# Patient Record
Sex: FEMALE | Race: WHITE
[De-identification: names, ages, dates, MRNs, and addresses within clinical notes are randomized per-mention and may not be internally consistent; named-entity substitution may affect disease eponyms.]

---

## 2017-01-26 ENCOUNTER — HOSPITAL ENCOUNTER (EMERGENCY)
Dept: HOSPITAL 25 - UCCORT | Age: 37
Discharge: HOME | End: 2017-01-26
Payer: COMMERCIAL

## 2017-01-26 VITALS — DIASTOLIC BLOOD PRESSURE: 89 MMHG | SYSTOLIC BLOOD PRESSURE: 129 MMHG

## 2017-01-26 DIAGNOSIS — Z88.2: ICD-10-CM

## 2017-01-26 DIAGNOSIS — Z88.1: ICD-10-CM

## 2017-01-26 DIAGNOSIS — Z88.8: ICD-10-CM

## 2017-01-26 DIAGNOSIS — J40: Primary | ICD-10-CM

## 2017-01-26 PROCEDURE — G0463 HOSPITAL OUTPT CLINIC VISIT: HCPCS

## 2017-01-26 PROCEDURE — 99212 OFFICE O/P EST SF 10 MIN: CPT

## 2017-01-26 NOTE — UC
Respiratory Complaint HPI





- HPI Summary


HPI Summary: 





COUGH X 5 DAYS , + CHEST CONGESTION, WHEEZING 


NO FEVER, + CHILLS 





- History of Current Complaint


Chief Complaint: UCGeneralIllness


Stated Complaint: WHEEZING,FEVER


Time Seen by Provider: 01/26/17 08:14


Hx Obtained From: Patient


Hx Last Menstrual Period: 1/12/17


Onset/Duration: Gradual Onset, Lasting Days - 5, Still Present


Timing: Constant


Severity Initially: Moderate


Severity Currently: Moderate


Character: Cough: Nonproductive


Aggravating Factors: Exertion, Deep Breaths


Associated Signs And Symptoms: Positive: Wheezing, URI, Nasal Congestion





- Allergies/Home Medications


Allergies/Adverse Reactions: 


 Allergies











Allergy/AdvReac Type Severity Reaction Status Date / Time


 


Prednisone Allergy  Swelling Verified 04/30/14 17:09





   Of  





   Face,Lips,&  





   Throat  


 


Sulfa Drugs Allergy  Itching Verified 04/30/14 17:09


 


Tetracyclines Allergy  Itching Verified 04/30/14 17:09


 


acid reflux med Allergy  Tachycardia Uncoded 04/30/14 17:09














PMH/Surg Hx/FS Hx/Imm Hx


Respiratory History Of: Reports: Asthma





- Surgical History


Surgical History: Yes


Surgery Procedure, Year, and Place: tubal; right hand x 2, including ulnar 

nerve entrapment 2000.  tonsilectomy'87.  eye '94





- Family History


Known Family History: 


   Negative: Diabetes





- Social History


Alcohol Use: None


Substance Use Type: None


Smoking Status (MU): Never Smoked Tobacco





Review of Systems


Constitutional: Fatigue


Skin: Negative


Eyes: Negative


ENT: Nasal Discharge


Respiratory: Cough


Cardiovascular: Negative


All Other Systems Reviewed And Are Negative: Yes





Physical Exam


Triage Information Reviewed: Yes


Appearance: Well-Appearing, No Pain Distress, Well-Nourished


Vital Signs: 


 Initial Vital Signs











Temp  98.6 F   01/26/17 08:15


 


Pulse  112   01/26/17 08:15


 


Resp  16   01/26/17 08:15


 


BP  129/89   01/26/17 08:15


 


Pulse Ox  97   01/26/17 08:15











Vital Signs Reviewed: Yes


Eyes: Positive: Conjunctiva Clear


ENT: Positive: Normal ENT inspection, Hearing grossly normal, Pharynx normal, 

Nasal congestion, Nasal drainage, TMs normal


Neck: Positive: Supple, Nontender, No Lymphadenopathy


Respiratory: Positive: Chest non-tender, Wheezing


Cardiovascular: Positive: No Murmur, Tachycardia


Skin Exam: Normal





UC Diagnostic Evaluation





- Laboratory


O2 Sat by Pulse Oximetry: 97





Respiratory Course/Dx





- Differential Dx/Diagnosis


Provider Diagnoses: BRONCHITIS





Discharge





- Discharge Plan


Condition: Stable


Disposition: HOME


Prescriptions: 


Azithromycin TAB* [Zithromax TAB (Z-BARRY) 250 mg #6 tabs] 2 tab PO .TODAY, THEN 

1 DAILY #1 barry


Levalbuterol HFA INHALER* [Xopenex Hfa Inhaler*] 1 puff INH Q4H PRN #1 mdi


 PRN Reason: Shortness Of Breath


Patient Education Materials:  Acute Bronchitis (ED)


Referrals: 


No Primary Care Phys,NOPCP [Primary Care Provider] - 7 Days

## 2017-03-01 ENCOUNTER — HOSPITAL ENCOUNTER (OUTPATIENT)
Dept: HOSPITAL 25 - ED | Age: 37
Setting detail: OBSERVATION
LOS: 1 days | Discharge: HOME | End: 2017-03-02
Attending: SURGERY | Admitting: SURGERY
Payer: MEDICAID

## 2017-03-01 ENCOUNTER — HOSPITAL ENCOUNTER (EMERGENCY)
Dept: HOSPITAL 25 - UCCORT | Age: 37
Discharge: LEFT BEFORE DISCHARGE | End: 2017-03-01
Payer: COMMERCIAL

## 2017-03-01 VITALS — DIASTOLIC BLOOD PRESSURE: 81 MMHG | SYSTOLIC BLOOD PRESSURE: 115 MMHG

## 2017-03-01 DIAGNOSIS — Z88.1: ICD-10-CM

## 2017-03-01 DIAGNOSIS — Z88.8: ICD-10-CM

## 2017-03-01 DIAGNOSIS — Z88.2: ICD-10-CM

## 2017-03-01 DIAGNOSIS — K21.9: ICD-10-CM

## 2017-03-01 DIAGNOSIS — R10.11: Primary | ICD-10-CM

## 2017-03-01 DIAGNOSIS — K81.0: Primary | ICD-10-CM

## 2017-03-01 DIAGNOSIS — J45.909: ICD-10-CM

## 2017-03-01 DIAGNOSIS — Z23: ICD-10-CM

## 2017-03-01 DIAGNOSIS — R11.0: ICD-10-CM

## 2017-03-01 LAB
ALBUMIN SERPL BCG-MCNC: 3.9 G/DL (ref 3.2–5.2)
ALP SERPL-CCNC: 77 U/L (ref 34–104)
ALT SERPL W P-5'-P-CCNC: 48 U/L (ref 7–52)
AMYLASE SERPL-CCNC: 55 U/L (ref 29–103)
ANION GAP SERPL CALC-SCNC: 6 MMOL/L (ref 2–11)
AST SERPL-CCNC: 28 U/L (ref 13–39)
BUN SERPL-MCNC: 12 MG/DL (ref 6–24)
BUN/CREAT SERPL: 16.2 (ref 8–20)
CALCIUM SERPL-MCNC: 8.6 MG/DL (ref 8.6–10.3)
CHLORIDE SERPL-SCNC: 104 MMOL/L (ref 101–111)
GLOBULIN SER CALC-MCNC: 2.7 G/DL (ref 2–4)
GLUCOSE SERPL-MCNC: 94 MG/DL (ref 70–100)
HCO3 SERPL-SCNC: 26 MMOL/L (ref 22–32)
HCT VFR BLD AUTO: 36 % (ref 35–47)
HGB BLD-MCNC: 11.9 G/DL (ref 12–16)
LIPASE SERPL-CCNC: 22 U/L (ref 11–82)
MCH RBC QN AUTO: 26 PG (ref 27–31)
MCHC RBC AUTO-ENTMCNC: 33 G/DL (ref 31–36)
MCV RBC AUTO: 80 FL (ref 80–97)
POTASSIUM SERPL-SCNC: 3.8 MMOL/L (ref 3.5–5)
PROT SERPL-MCNC: 6.6 G/DL (ref 6.4–8.9)
RBC # BLD AUTO: 4.5 10^6/UL (ref 4–5.4)
SODIUM SERPL-SCNC: 136 MMOL/L (ref 133–145)
WBC # BLD AUTO: 5.8 10^3/UL (ref 3.5–10.8)

## 2017-03-01 PROCEDURE — 87086 URINE CULTURE/COLONY COUNT: CPT

## 2017-03-01 PROCEDURE — 83690 ASSAY OF LIPASE: CPT

## 2017-03-01 PROCEDURE — 85025 COMPLETE CBC W/AUTO DIFF WBC: CPT

## 2017-03-01 PROCEDURE — 81015 MICROSCOPIC EXAM OF URINE: CPT

## 2017-03-01 PROCEDURE — 76705 ECHO EXAM OF ABDOMEN: CPT

## 2017-03-01 PROCEDURE — G0378 HOSPITAL OBSERVATION PER HR: HCPCS

## 2017-03-01 PROCEDURE — 86140 C-REACTIVE PROTEIN: CPT

## 2017-03-01 PROCEDURE — G0463 HOSPITAL OUTPT CLINIC VISIT: HCPCS

## 2017-03-01 PROCEDURE — 96372 THER/PROPH/DIAG INJ SC/IM: CPT

## 2017-03-01 PROCEDURE — 94760 N-INVAS EAR/PLS OXIMETRY 1: CPT

## 2017-03-01 PROCEDURE — 99212 OFFICE O/P EST SF 10 MIN: CPT

## 2017-03-01 PROCEDURE — 82150 ASSAY OF AMYLASE: CPT

## 2017-03-01 PROCEDURE — 80053 COMPREHEN METABOLIC PANEL: CPT

## 2017-03-01 PROCEDURE — 90471 IMMUNIZATION ADMIN: CPT

## 2017-03-01 PROCEDURE — 0FT44ZZ RESECTION OF GALLBLADDER, PERCUTANEOUS ENDOSCOPIC APPROACH: ICD-10-PCS | Performed by: SURGERY

## 2017-03-01 PROCEDURE — 90686 IIV4 VACC NO PRSV 0.5 ML IM: CPT

## 2017-03-01 PROCEDURE — 99284 EMERGENCY DEPT VISIT MOD MDM: CPT

## 2017-03-01 PROCEDURE — 81003 URINALYSIS AUTO W/O SCOPE: CPT

## 2017-03-01 PROCEDURE — 36415 COLL VENOUS BLD VENIPUNCTURE: CPT

## 2017-03-01 PROCEDURE — 88304 TISSUE EXAM BY PATHOLOGIST: CPT

## 2017-03-01 PROCEDURE — G0008 ADMIN INFLUENZA VIRUS VAC: HCPCS

## 2017-03-01 NOTE — RAD
Indication: Right upper quadrant pain.



Real-time sonography of the right upper quadrant was performed.



The liver measures 18 cm in length. It is diffusely increased in echogenicity consistent

with hepatic steatosis. No focal lesions or intrahepatic ductal dilatation is noted.



The gallbladder demonstrates moderate distention. There are nonshadowing echogenic foci in

the dependent portion of the gallbladder which appear nonshadowing calculi or cholesterol

polyps. There is a small amount of pericholecystic fluid present. There is a sonographic

Rasheed's sign.



Right kidney measures 10.7 x 3.6 x 3.9 cm with no hydronephrosis.



Pancreas is not visualized due to overlying gas.



Aorta and inferior vena cava are unremarkable.



IMPRESSION: Distended gallbladder with pericholecystic fluid and a positive sonographic

Rasheed's sign. Echogenic foci in the gallbladder which are nonshadowing in the dependent

portion of the gallbladder for which this may represent polyps or nonshadowing gallstones.

## 2017-03-01 NOTE — UC
Abdominal Pain Female HPI





- HPI Summary


HPI Summary: 





35 yo female with the acute onset of 8/10 ruq abd pain about 2AM


pain radiates across upper abd and goes to back


nausea but no vomiting


no f/c


last week had gi bug and lost wt





only abd surg has been bilateral TL





- History of Current Complaint


Chief Complaint: UCGI


Stated Complaint: RIB/BACK PAIN


Time Seen by Provider: 03/01/17 07:34


Hx Obtained From: Patient


Hx Last Menstrual Period: 2/15/17


Onset/Duration: Sudden Onset, Lasting Hours


Timing: Constant


Severity Initially: Severe


Severity Currently: Mild


Pain Intensity: 3


Pain Scale Used: 0-10 Numeric


Location: Discrete At: RUQ


Radiates: Yes


Radiates to: Back


Character: Colicy


Aggravating Factor(s): Nothing


Alleviating Factor(s): Nothing


Associated Signs and Symptoms: Positive: Nausea


Allergies/Adverse Reactions: 


 Allergies











Allergy/AdvReac Type Severity Reaction Status Date / Time


 


Prednisone Allergy  Swelling Verified 03/01/17 07:22





   Of  





   Face,Lips,&  





   Throat  


 


Sulfa Drugs Allergy  Itching Verified 03/01/17 07:22


 


Tetracyclines Allergy  Itching Verified 03/01/17 07:22


 


acid reflux med Allergy  Tachycardia Uncoded 03/01/17 07:22











Home Medications: 


 Home Medications





Famotidine TAB* [Pepcid 20 MG TAB*] 20 mg PO DAILY 03/01/17 [History Confirmed 

03/01/17]


Probiotic Product [Probiotic Daily] 1 cap PO DAILY 03/01/17 [History Confirmed 

03/01/17]











PMH/Surg Hx/FS Hx/Imm Hx


Previously Healthy: Yes


Respiratory History Of: Reports: Asthma





- Surgical History


Surgical History: Yes


Surgery Procedure, Year, and Place: tubal; right hand x 2, including ulnar 

nerve entrapment 2000.  tonsilectomy'87.  eye '94





- Family History


Known Family History: Positive: Hypertension


   Negative: Diabetes





- Social History


Alcohol Use: None


Substance Use Type: None


Smoking Status (MU): Never Smoked Tobacco





Review of Systems


Constitutional: Negative


Skin: Negative


Eyes: Negative


ENT: Negative


Respiratory: Negative


Cardiovascular: Negative


Gastrointestinal: Abdominal Pain


Genitourinary: Negative


Motor: Negative


Neurovascular: Negative


Musculoskeletal: Negative


Neurological: Negative


Psychological: Negative


All Other Systems Reviewed And Are Negative: Yes





Physical Exam


Triage Information Reviewed: Yes


Appearance: Well-Nourished, Pain Distress


Vital Signs: 


 Initial Vital Signs











Temp  99.2 F   03/01/17 07:25


 


Pulse  117   03/01/17 07:25


 


Resp  20   03/01/17 07:25


 


BP  133/73   03/01/17 07:25


 


Pulse Ox  99   03/01/17 07:25











Vital Signs Reviewed: Yes


Eyes: Positive: Conjunctiva Clear


ENT: Positive: Hearing grossly normal.  Negative: Nasal congestion, Nasal 

drainage


Neck: Positive: Supple, Nontender


Respiratory: Positive: Lungs clear, Normal breath sounds, No respiratory 

distress


Cardiovascular: Positive: No Murmur, Pulses Normal, Brisk Capillary Refill


Abdomen Description: Negative: Nontender - ndernessuq te


Bowel Sounds: Positive: Present


Musculoskeletal: Positive: ROM Intact, No Edema


Neurological: Positive: Alert


Psychological Exam: Normal


Skin Exam: Normal





Abd Pain Female Course/Dx





- Course


Course Of Treatment: TO [facility] ED.  D/W attending.  declines EMS.   will 

drive her





- Differential Dx/Diagnosis


Provider Diagnoses: RUQ abd pain.  suspect gall bladder disease





Discharge





- Discharge Plan


Condition: Stable


Disposition: AGAINST MEDICAL ADVICE


Referrals: 


No Primary Care Phys,NOPCP [Primary Care Provider] -

## 2017-03-01 NOTE — ED
Juan Francisco SIDDIQUI Adam, scribed for Kurt Garcia MD on 03/01/17 at 1037 .





Abdominal Pain/Female





- HPI Summary


HPI Summary: 


Pt is a 36 year old female presenting with RUQ abdominal pain. The pain set on 

suddenly at 03:20 this morning, waking the pt from sleep. She states that she 

had already woken up several times to urinate during the night. The RUQ pain 

radiates around to the pt's back. She went to [facility] and 

states that she was sent to the ED for ultrasound. Her pain was 8/10 in 

severity but is currently 4-5 after she was given Toradol at the clinic. Pt 

also c/o nausea, chills, and soft stool which is not watery. She denies 

vomiting. She states that she and the rest of her family had a stomach bug last 

week but that she has not experienced this pain before. PMHx of asthma and 

macular degeneration. Surgical Hx of tubal ligation and tonsillectomy. No 

tobacco/alcohol use.








- History of Current Complaint


Chief Complaint: EDAbdPain


Stated Complaint: ABD/BACK PAIN/NAUSEA


Time Seen by Provider: 03/01/17 10:26


Hx Obtained From: Patient


Hx Last Menstrual Period: 2/15/17


Onset/Duration: Sudden Onset, Lasting Hours, Still Present


Timing: Constant


Severity Initially: Moderate


Severity Currently: Moderate


Pain Intensity: 6


Pain Scale Used: 0-10 Numeric


Location: Discrete At: RUQ


Radiates: Yes


Radiates to: Back


Aggravating Factor(s): Nothing


Alleviating Factor(s): Medications - Toradol


Associated Signs and Symptoms: Positive: Back Pain, Nausea, Diarrhea - Soft 

stool, not watery, Other: - Chills.  Negative: Vomiting


Allergies/Adverse Reactions: 


 Allergies











Allergy/AdvReac Type Severity Reaction Status Date / Time


 


Prednisone Allergy  Swelling Verified 03/01/17 10:06





   Of  





   Face,Lips,&  





   Throat  


 


Sulfa Drugs Allergy  Itching Verified 03/01/17 10:06


 


Tetracyclines Allergy  Itching Verified 03/01/17 10:06


 


acid reflux med Allergy  Tachycardia Uncoded 03/01/17 10:06














PMH/Surg Hx/FS Hx/Imm Hx


Respiratory History: Reports: Hx Asthma





- Surgical History


Surgery Procedure, Year, and Place: tubal; right hand x 2, including ulnar 

nerve entrapment 2000.  tonsilectomy'87.  eye '94


Infectious Disease History: No


Infectious Disease History: 


   Denies: Traveled Outside the [place] in Last 30 Days





- Family History


Known Family History: Positive: Hypertension, Other - "Everything"


   Negative: Diabetes





- Social History


Occupation: Employed Full-time - "Homemaker"


Lives: With Family - 


Alcohol Use: None


Hx Substance Use: No


Substance Use Type: Reports: None


Hx Tobacco Use: No


Smoking Status (MU): Never Smoked Tobacco





Review of Systems


Positive: Chills


Positive: Abdominal Pain, Diarrhea - Soft stool, not watery, Nausea.  Negative: 

Vomiting


All Other Systems Reviewed And Are Negative: Yes





Physical Exam





- Summary


Physical Exam Summary: 





VITAL SIGNS: Reviewed. 


GENERAL: Patient is a well developed and nourished female who is lying 

comfortable in the stretcher. Patient is not in any acute respiratory distress. 


HEAD AND FACE: Normocephalic and atraumatic. 


EYES: PERRLA, EOMI x 2, No injected conjunctiva.


EARS: Hearing grossly intact. Ear canals and tympanic membranes are WNL.


MOUTH: Oropharynx within normal limits. 


NECK: Supple, trachea is midline, no adenopathy, no JVD.


CHEST: Symmetric, no tenderness at palpation 


LUNGS: Clear to auscultation bilaterally. No wheezing or crackles.


CVS: RRR,, S1 and S2 present, no murmurs or gallops appreciated. 


ABDOMEN: Soft, positive RUQ tenderness. No signs of distention. Positive bowel 

sounds. No rebound no guarding, and no masses palpated. No abdominal bruit or 

pulsations. 


EXTREMITIES: FROM in all major joints, no edema, no cyanosis or clubbing.


NEURO: Alert and oriented x 3. No acute neurological deficits. Speech is normal.


SKIN: Dry and warm





Triage Information Reviewed: Yes


Vital Signs On Initial Exam: 


 Initial Vitals











Temp Pulse Resp BP Pulse Ox


 


 99.1 F   103   16   125/82   98 


 


 03/01/17 10:06  03/01/17 10:06  03/01/17 10:06  03/01/17 10:06  03/01/17 10:06











Vital Signs Reviewed: Yes





Diagnostics





- Vital Signs


 Vital Signs











  Temp Pulse Resp BP Pulse Ox


 


 03/01/17 10:06  99.1 F  103  16  125/82  98














- Laboratory


Result Diagrams: 


 03/01/17 10:40





 03/01/17 10:40


Lab Statement: Any lab studies that have been ordered have been reviewed, and 

results considered in the medical decision making process.





- Additional Comments


Diagnostic Additional Comments: 


Gallbladder ultrasound - IMPRESSION: Distended gallbladder with pericholecystic 

fluid and a positive sonographic


Rasheed's sign. Echogenic foci in the gallbladder which are nonshadowing in the 

dependent


portion of the gallbladder for which this may represent polyps or nonshadowing 

gallstones.








Abdominal Pain Fem Course/Dx





- Course


Course Of Treatment: Surgery was called at approximately 12:00. The doctor was 

in surgery but was to be notified of the request for consult.  13:10 - The PA 

for Dr. Choi came to examine the patient and decided to discharge her home, but 

the patient is now in pain.  13:15 - Talked to Dr. Choi on the phone. He will 

send the PA back down to re-examine the patient.  13:26 - Dr. Choi's PA came to 

re-examine the patient and they have decided to accept her for admission.  Pt 

is a 36 year old female presenting with RUQ abdominal pain. The pain set on 

suddenly at 03:20 this morning, waking the pt from sleep. She states that she 

had already woken up several times to urinate during the night. The RUQ pain 

radiates around to the pt's back. She went to [facility] and 

states that she was sent to the ED for ultrasound. Her pain was 8/10 in 

severity but is currently 4-5 after she was given Toradol at the clinic. Pt 

also c/o nausea, chills, and soft stool which is not watery. She denies 

vomiting. She states that she and the rest of her family had a stomach bug last 

week but that she has not experienced this pain before. PMHx of asthma and 

macular degeneration. Surgical Hx of tubal ligation and tonsillectomy. No 

tobacco/alcohol use.  BW is WNL except for hemoglobin of 11.9 and C-Reactive 

Protein of 44.53. US of RUQ shows a distended gallbladder with pericholecystic 

fluid and positive Rasheed's sign. I discussed the case with Dr. Choi who sent a 

PA to evaluate the patient. Inititially they recommended that the pt be 

discharged seeing as she was asymptomatic. Before discharge the pt developed 

more pain with mild nausea without vomiting. The pt was reassessed by Dr. Choi'

s PA and now they recommend for the pt to be admitted for further work-up and 

management. The pt was given IV fluids, somethng for nausea, and morphine for 

pain. The pt is hemodynamically stable and A&Ox3. She will be admitted to Dr. Choi's services.





- Diagnoses


Differential Diagnosis: Positive: Constipation, Gall Bladder Disease, 

Pancreatitis, Renal Colic, Urinary Tract Infection


Provider Diagnoses: 


 Acute cholecystitis








Discharge





- Discharge Plan


Condition: Stable


Disposition: ADMITTED TO [facility]


Referrals: 


No Primary Care Phys,NOPCP [Primary Care Provider] - 





The documentation as recorded by the Juan Francisco joe Adam accurately reflects 

the service I personally performed and the decisions made by me, Kurt Garcia MD.

## 2017-03-01 NOTE — PN
Progress Note





- Progress Note


Note: 





Brief Operative Note:





Preop Dx: Acute cholecystitis


Postop Dx: same


Procedure: laparoscopic cholecystectomy


Anesthesia: GET


Surgeon: Zachary


Asst: PAMELA Thomas


EBL: <100 ml


Fluids: 1000 ml RL


Drains: none


Specimen: GB


Findings: dictated

## 2017-03-01 NOTE — CONS
Amended report to correct account number.



CONSULTATION REPORT:

 

DATE OF CONSULT:  03/01/17

 

ATTENDING PHYSICIAN:  Aric Sharma MD.

 

REASON FOR CONSULTATION:  Right upper quadrant abdominal pain and acute 
cholecystitis.

 

CHIEF COMPLAINT:  Abdominal pain.

 

HISTORY OF PRESENT ILLNESS:  Ms. Callaway is a pleasant 36-year-old female who 
presented to the emergency room earlier today with complaints of progressively 
worsening epigastric and right upper quadrant pain since early this morning.  
The patient notes that her pain started roughly around 3 o'clock this morning 
that awakened her up from her sleep.  She notes pain has been dull and sharp on 
occasion, located in the epigastric and right upper quadrant with radiation to 
her right flank and back.  She notes associated nausea, but denies any 
vomiting.  She already has awoken several times during the night, but pain has 
gotten progressively worse that kept her awake since 3 o'clock this morning.  
She denies any other associated symptoms.  She has never had any similar 
complaints in the past.  She denies any jaundice or changes in the color of 
stools or urine.  She went to [facility] where she resides close by 
and she was evaluated there and was told to come to the emergency room for 
further evaluation regarding possible gall-bladder disease.  Her pain back then 
was 8/10 in severity and was relieved after she was given a shot of Toradol.  
During her ER visit, the patient noted that pain eventually got better and 
denies any further nausea or  vomiting. She notes that herself and her entire 
family had had "stomach bug" last week, but everybody has been doing well since 
last Saturday with the exception of occasional diarrhea last week and she 
denies any changes in the bowel habits.  She otherwise is a relatively healthy 
young female who appeared comfortable at this time of consultation.

 

PAST MEDICAL HISTORY:  Significant for macular degeneration for which she had a 
surgery on her left eye at age 14.  She tells me that she is legally blind.  
She also has a history of asthma with occasional use of inhalers.

 

PAST SURGICAL HISTORY:  Significant for tubal ligation and tonsillectomy.  She 
denies any prior abdominal surgery.

 

CURRENT MEDICATIONS:  At home include:

1.  Pepcid 20 mg p.o. daily.

2.  Albuterol inhaler 2 puffs as needed for shortness of breath.

3.  Probiotic product such as yogurt.

 

ALLERGIES:  She reports allergies to PREDNISONE causing swelling of her face 
and throat, SULFA DRUGS and TETRACYCLINES that caused itching as well as some 
acid reflux medicine that she could not remember the name that caused 
tachycardia.

 

FAMILY HISTORY:  Significant for hypertension and cardiovascular disease on 
both her maternal and paternal sides, but denies any colorectal malignancies.  
She also notes history of gallbladder disease in her maternal grandmother and 
few aunts and uncles.

 

SOCIAL HISTORY:  The patient is a nonsmoker who drinks rarely and denies any 
recreational drug use.

 

REVIEW OF SYSTEMS:  See HPI.  Otherwise negative.  She denies any fever, chills
, or recent weight loss.  No sore throat, difficulty breathing, or dysphagia.  
No dyspnea, shortness of breath, or chest pain.  Denies any syncope, blurred 
vision, or diplopia.  She denies any hematuria, dysuria, or urinary frequency.

 

PHYSICAL EXAM:  General:  She is a pleasant, obese, middle-aged female in no 
acute distress or discomfort at the time of consultation.  Vitals:  Her vitals 
revealed a temperature of 99.1.  Her blood pressure is 125/77, O2 sats of 98%, 
pulse of 102. HEENT:  Sclerae anicteric.  PERRLA.  EOMs intact.  Oropharynx is 
pink, moist with no exudate.  Neck:  Supple.  Trachea midline.  No cervical 
adenopathy, thyromegaly, or JVD.  Lungs:  Clear to auscultation bilaterally.  
Heart:  Regular rate and rhythm.  Normal S1 and S2 without rubs, murmurs, or 
gallops.  Back:  With normal curvature.  No CVA tenderness.  Breast Exam:  
Deferred at this time.  Abdomen: Soft and nondistended.  There is moderate 
epigastric and right upper quadrant tenderness on palpation. There is no 
guarding, rigidity, or rebound tenderness. No hernias, masses, or 
hepatosplenomegaly.  There is very mild positivity for Rasheed's sign on deep 
palpation.  Extremities: Without cyanosis, clubbing, or edema.  Neurologic:  
Grossly intact.  Rectal:  Exam deferred at this time.

 

LABORATORY WORKUP:  The patient had CBC and chemistry workup at ED visit 
revealing normal white count of 5800, hemoglobin 11.9, hematocrit 36, and 
platelets of 213. Her sodium was 136, potassium 3.8, chloride 104, carbon 
dioxide 26, BUN is 12, creatinine 0.7, and glucose of 94.  Her C-reactive 
protein was elevated at 44.5 value.  The remainder of her LFTs, amylase, and 
lipase were normal.

 

ACCESSORY DIAGNOSTIC DATA:  The patient had a right upper quadrant ultrasound 
that revealed findings consistent with distended gallbladder with 
pericholecystic fluid and positive sonographic Rasheed's sign.  There was no 
gallbladder wall thickening or any evidence of CBD dilatation.  There is also 
some suggestion of gallstones or possibly gallbladder polyps within the cavity.

 

ASSESSMENT:  A 36-year-old female with signs and symptoms consistent with acute 
attack of gallbladder disease and ultrasound findings consistent with acute 
cholecystitis.

 

PLAN:  During her ER stay, the patient had noted that her pain has gotten 
significantly worse since I saw her earlier, for which I talked to her 
proceeding with surgery later today.  The rationale, indications, risks, and 
benefits of proceeding with a laparoscopic cholecystectomy were discussed with 
her today.  The risks include but not limited to infection, bleeding, or injury 
to adjacent structures.  I also expressed the option of managing her pain as 
well as seeing her in the office as an outpatient to schedule her surgery 
electively.  Given her recurrent pain on and off since earlier this morning, 
the patient wishes to proceed with surgery later this afternoon.  The case was 
discussed with Dr. Sharma who agreed to the plans as outlined.  The patient will 
be kept n.p.o.  We will start her on IV fluids as well as analgesics and Pepcid 
in anticipation for a laparoscopic cholecystectomy later this afternoon.  We 
will follow her up accordingly.

 

____________________________________ PAMELA OWENS

 

[id number]/[facility] #: [id number]

[facility] PartnersROEL

## 2017-03-02 VITALS — DIASTOLIC BLOOD PRESSURE: 65 MMHG | SYSTOLIC BLOOD PRESSURE: 124 MMHG

## 2017-03-02 NOTE — DS
DISCHARGE SUMMARY:

 

DATE OF ADMISSION:  17

 

DATE OF DISCHARGE:  17

 

PATIENT OF:  Dr. Aric Sharma.

 

REASON FOR ADMISSION:  Abdominal pain and acute cholecystitis.

 

ADMISSION DIAGNOSES:

1.  Abdominal pain, right upper quadrant.

2.  Acute cholecystitis.

3.  Gastroesophageal reflux disease.

4.  Asthma.

 

DISCHARGE DIAGNOSES:

1.  Abdominal pain, right upper quadrant.

2.  Acute cholecystitis.

3.  Gastroesophageal reflux disease.

4.  Asthma.

 

ADMITTING PHYSICIAN:  Dr. Aric Sharma.

 

CONSULTATIONS:  None.

 

PROCEDURE:  Laparoscopic cholecystectomy on 17.

 

BRIEF HISTORY OF PRESENT ILLNESS:  Ms. Callaway is a pleasant 36-year-old female 
who presented to the emergency room with complaints of acute onset of right 
upper quadrant abdominal pain since earlier that morning.  She noted that she 
got up roughly around 3 o'clock on 17 with sudden onset of epigastric and 
right upper quadrant abdominal pain that has gotten progressively worse for 
which she went to the urgent care clinic and was evaluated.  The patient was 
asked to go to the emergency room for further evaluation regarding gallbladder 
disease.  During her ER visit, she had an ultrasound that revealed dilated 
gallbladder and pericholecystic fluid consistent with acute cholecystitis.  We 
were asked to see the patient for consultation regarding her pain and findings 
of the ultrasound for which she was admitted and planned to have surgery later 
that day.  She otherwise is a relatively healthy, middle-aged female with a 
past medical history significant for GERD and asthma.

 

HOSPITAL COURSE:  The patient was admitted directly from the emergency room in 
anticipation for surgery later that day.  She went to the operating room in the 
afternoon of 17 and she underwent a laparoscopic cholecystectomy that was 
uneventful.  She went to recovery after that in stable condition and was 
admitted back to the floor for observation overnight.  She did very well, was 
only mild incisional discomfort on the next morning.  She denied any nausea, 
vomiting, or any changes in the bowel habits.  She was able to tolerate clear 
liquid diet and her diet was eventually advanced to regular on the next day.  
She continued to improve and was ambulatory and was ready to be discharged home 
on 17.

 

DISCHARGE MEDICATIONS:  The patient was discharged on her usual home 
medications includin.  Pepcid 20 mg p.o. daily.

2.  Xopenex inhaler 2 puffs q.4 to 6 hours as needed for pain.

3.  Tylenol Extra Strength as needed for pain.

 

PROBLEM LIST:  Acute cholecystitis, status post laparoscopic cholecystectomy on 
17.

 

____________________________________ 

PAMELA OWENS

 

[id number]/[facility] #: [id number]

MTDD

## 2017-03-02 NOTE — SURGPN
Subjective





- Introduction


-: 





Admitted on: 03/01/2017





Patient's surgical date: 03/01/2017





Procedure completed: Laparoscopic cholecystectomy








- Medications


-: 


 Active Medications











Generic Name Dose Route Start Last Admin





  Trade Name Lisa  PRN Reason Stop Dose Admin


 


Acetaminophen  650 mg  03/01/17 13:48  03/02/17 06:27





  Tylenol Tab*  PO   650 mg





  Q6H PRN   Administration





  PAIN   


 


Levalbuterol HCl  1 puff  03/01/17 18:48  





  Xopenex Hfa Inhaler*  INH   





  Q4H PRN   





  SHORTNESS OF BREATH   


 


Ondansetron HCl  4 mg  03/01/17 13:50  





  Zofran Inj*  IV   





  Q4H PRN   





  NAUSEA   


 


Oxycodone/Acetaminophen  1 tab  03/01/17 18:47  





  Percocet 5/325 Tab*  PO   





  Q4H PRN   





  PAIN - MODERATE   


 


Oxycodone/Acetaminophen  2 tab  03/01/17 18:48  





  Percocet 5/325 Tab*  PO   





  Q4H PRN   





  PAIN - MODERATE TO SEVERE   


 


Pharmacy Profile Note  1 note  03/04/17 18:00  





  Scopolomine Patch Remove*  PATCH OFF  03/04/17 23:59  





  .AFTER 72 HOURS HARLEY   














- Comments


Comments: 





Patient reports doing well, denies any complaints. Tolerating diet, denies N/V, 

fever or chills.





Objective





- Objective


-: 





Awake and alert, sitting on her bed, in NAD





- Intake and Output


-: 


 Intake & Output











 02/28/17 03/01/17 03/02/17 03/03/17





 06:59 06:59 06:59 06:59


 


Intake Total   1919 


 


Output Total   1475 


 


Balance   444 


 


Weight   175 lb 


 


Intake:    


 


  IV Fluids   1198 


 


    LR   1000 


 


    NS 50ML, Cefazolin 2G   50 


 


  IVPB   421 


 


    LR   421 


 


  Oral   300 


 


Output:    


 


  Urine   1475 


 


Other:    


 


  # Bowel Movements   0 


 


  Estimated Blood Loss   MINIMAL 





  Comment    





 














Surgical Physical Exam





- Comments


-: 





Vitals reviewed, afebrile.


Lungs, CTA bilat.


Abdomen: Soft, no-distended. Mild incisional tenderness. No rigidity, gaurding 

or rebound tenderness. Incisions clean, dry and intact.


Ext.: No edema.





Assessment and Plan





- Assessment


-: 





A 35 y/o female, s/p laparoscopic cholecystectomy, doing well.





- Plan


Surgical Plan of Care: Discontinue IV, Discharge


Additional Comments: 





Patient will be discharged to home this AM. She is stable, doing well and in no 

pain. All discharge instructions were discussed with her. She will F/U with 

office next week.

## 2017-03-09 NOTE — OP
DATE OF OPERATION:  03/01/17 - ROOM #339

 

DATE OF BIRTH:  [birth year]

 

SURGEON:  Aric Sharma MD.

 

ASSISTANT:  PAMELA Souza



ANESTHESIOLOGIST:  Carl Barragan MD

 

ANESTHESIA:  General anesthesia.

 

PRE-OP DIAGNOSIS:  Acute cholecystitis.

 

POST-OP DIAGNOSIS:  Acute cholecystitis.

 

OPERATIVE PROCEDURE:  Laparoscopic cholecystectomy.

 

ESTIMATED BLOOD LOSS:  Less than 100 cc.

 

FLUIDS:  1000 cc of Lactated Ringer's.

 

DRAINS:  None.

 

SPECIMEN:  Gallbladder.

 

INDICATIONS OF PROCEDURE:  Ms. Callaway is a 36-year-old female who was admitted 
to the hospitalist service, we were consulted, and the patient was seen by 
Juan SANTIAGO, and the case was discussed on 03/01/17.  After examining the 
patient, I agreed with the diagnosis of acute cholecystitis and recommended 
laparoscopic cholecystectomy.  I discussed the procedure with her, going over 
the risks, benefits, and alternatives and the patient agreed to proceed.  As 
per routine, we spoke of the possible complications, which include but are not 
limited to bleeding, infection, bile leak, common bile duct or bowel injury, 
need for open procedure, or need for additional procedures.  The patient signed 
consent, was marked, and then taken to the operating room.

 

DESCRIPTION OF PROCEDURE:  The patient was identified in the preoperative area, 
marked and brought to the OR, placed on the operating room table in the supine 
position.  Preoperative antibiotics were given.  Sequential devices were placed 
on bilateral lower extremities.  General anesthesia was induced.  The patient's 
abdomen was prepped and draped in a standard surgical fashion and a time-out 
was performed.

 

The folds of the umbilicus were elevated anteriorly and a Veress needle was 
inserted into the abdominal cavity, which was then allowed to be insufflated to 
a pressure of 15 mmHg.  The patient tolerated the insufflation well.  A 
paraumbilical incision was then made and a 5-mm trocar was inserted.  Veress 
needle was removed. Review of the abdomen showed no evidence of trocar injury 
or Veress needle injury.

 

Attention was then turned to the right upper quadrant.  Additional trocars were 
then placed in the following position; a 12 mm in the subxiphoid area and two 5 
mm along the right costal margin.  Fundus of the gallbladder was identified, 
this was somewhat edematous, it was grasped and retracted over the liver.  
Infundibulum was then grasped and retracted laterally.  Electrocautery was used 
to free the peritoneum off the lateral aspect of the gallbladder and off the 
medial aspect.  This gave us an opportunity to see the cystic duct extending 
towards the jackelyn hepatis.  The common bile duct was never clearly visualized, 
but we persisted in isolating the cystic duct. Cystic artery was isolated, was 
doubly clipped and ligated, cystic duct was triply clipped and ligated.  
Gallbladder was perforated at this point, but we are able to remove it from its 
liver bed, place it in the endoscopic retrieval bag and bring it out through 
the subxiphoid port site. Suction irrigation was used until the effluent was 
clear.  The cystic duct stump and cystic artery stump showed no bleeding or 
bile leak.  The abdomen was then allowed to collapse.  Trocars were removed 
under direct vision.  All four skin incisions were reapproximated with 4-0 
Monocryl subcuticular sutures followed by sterile dressing.

 

 [id number]/[facility] #: [id number]

GIANNI

## 2017-06-07 ENCOUNTER — HOSPITAL ENCOUNTER (EMERGENCY)
Dept: HOSPITAL 25 - UCCORT | Age: 37
Discharge: HOME | End: 2017-06-07
Payer: COMMERCIAL

## 2017-06-07 VITALS — DIASTOLIC BLOOD PRESSURE: 64 MMHG | SYSTOLIC BLOOD PRESSURE: 110 MMHG

## 2017-06-07 DIAGNOSIS — J02.0: Primary | ICD-10-CM

## 2017-06-07 PROCEDURE — G0463 HOSPITAL OUTPT CLINIC VISIT: HCPCS

## 2017-06-07 PROCEDURE — 99212 OFFICE O/P EST SF 10 MIN: CPT

## 2017-06-07 PROCEDURE — 87651 STREP A DNA AMP PROBE: CPT

## 2017-06-07 NOTE — UC
Throat Pain/Nasal Farooq HPI





- HPI Summary


HPI Summary: 





The patient comes in today for:





1.  Sore throat:





Onset:  5 days.


Palliative/provocative: Swallowing makes it worse as does eating.


Quality: Scratchy


Region:  Posterior pharynx.


Severity: 5/10


Time: Constant.


Associated symptoms:


   Cough: Present, non-productive.


   Rhinitis:  None.


   FEvers: None.








*





- History of Current Complaint


Chief Complaint: UCGeneralIllness


Stated Complaint: SORE THROAT


Time Seen by Provider: 06/07/17 08:40


Hx Obtained From: Patient


Hx Last Menstrual Period: 06/05/17





- Allergies/Home Medications


Allergies/Adverse Reactions: 


 Allergies











Allergy/AdvReac Type Severity Reaction Status Date / Time


 


Prednisone Allergy  Swelling Verified 06/07/17 08:33





   Of  





   Face,Lips,&  





   Throat  


 


Ranitidine [From Zantac] Allergy  Tachycardia Verified 06/07/17 08:33


 


Sulfa Drugs Allergy  Itching Verified 06/07/17 08:33


 


Tetracyclines Allergy  Itching Verified 06/07/17 08:33


 


Seasonal Allergies Allergy  Runny Nose Uncoded 06/07/17 08:33











Home Medications: 


 Home Medications





Ibuprofen TAB* [Advil TAB*] 600 mg PO Q6H PRN 06/07/17 [History Confirmed 06/07/ 17]











PMH/Surg Hx/FS Hx/Imm Hx


Previously Healthy: No


Respiratory History: Asthma


GI/ History: Gastroesophageal Reflux





- Surgical History


Surgical History: Yes


Surgery Procedure, Year, and Place: [place] [facility] [place] 

[place], [facility] [place]; [place] x 2, including ulnar nerve entrapment 2000.  

tonsilectomy'87





- Family History


Known Family History: Positive: Cardiac Disease, Hypertension, Other - 

"Everything"


   Negative: Diabetes





- Social History


Occupation: Unemployed


Alcohol Use: None


Substance Use Type: None


Smoking Status (MU): Never Smoked Tobacco





- Immunization History


Most Recent Influenza Vaccination: 03/02/17


Most Recent Tetanus Shot: unknown


Most Recent Pneumonia Vaccination: approx 2010





Review of Systems


Constitutional: Negative


Skin: Negative


Eyes: Negative


ENT: Sore Throat


Respiratory: Negative


Cardiovascular: Negative


Gastrointestinal: Negative


Genitourinary: Negative


All Other Systems Reviewed And Are Negative: Yes





Physical Exam


Triage Information Reviewed: Yes


Appearance: Well-Appearing, No Pain Distress, Well-Nourished


Vital Signs: 


 Initial Vital Signs











Temp  98.8 F   06/07/17 08:30


 


Pulse  90   06/07/17 08:30


 


Resp  16   06/07/17 08:30


 


BP  110/64   06/07/17 08:30


 


Pulse Ox  98   06/07/17 08:30











Vital Signs Reviewed: Yes


Eyes: Positive: Conjunctiva Clear.  Negative: Discharge


ENT: Positive: Hearing grossly normal.  Negative: Pharyngeal erythema, Nasal 

congestion, Nasal drainage, TM bulging, TM dull, TM red, Tonsillar swelling, 

Tonsillar exudate


Dental: Negative: Gross Decay/Caries @, Dental Fracture @


Neck: Positive: Supple, Nontender, No Lymphadenopathy.  Negative: Nuchal 

Rigidity


Respiratory: Positive: Lungs clear, No respiratory distress, No accessory 

muscle use.  Negative: Crackles, Wheezing


Cardiovascular: Positive: RRR, No Murmur


Abdomen Description: Positive: Nontender, No Organomegaly, Soft.  Negative: 

Distended, Guarding


Musculoskeletal: Positive: Strength Intact, ROM Intact


Neurological: Positive: Alert, Muscle Tone Normal


Psychological: Positive: Age Appropriate Behavior, Consolable


Skin: Negative: rashes, breakdown





Diagnostics





- Laboratory


Diagnostic Studies Completed/Ordered: Strep test: (+).





Throat Pain/Nasal Course/Dx





- Course


Assessment/Plan: Patient told of the positive strep test.  Treatment discussed.





- Differential Dx/Diagnosis


Provider Diagnoses: Strep throat.





Discharge





- Discharge Plan


Condition: Stable


Disposition: HOME


Patient Education Materials:  Strep Throat (ED)


Referrals: 


No Primary Care Phys,NOPCP [Primary Care Provider] - 1 Week


(Please see your primary care provider in about a week.  If you don't have a 

primary care provider, 


please reference the included sheet of local provider.  If you get worse, 

please be seen sooner.)

## 2018-02-13 ENCOUNTER — HOSPITAL ENCOUNTER (EMERGENCY)
Dept: HOSPITAL 25 - UCCORT | Age: 38
Discharge: HOME | End: 2018-02-13
Payer: COMMERCIAL

## 2018-02-13 VITALS — DIASTOLIC BLOOD PRESSURE: 73 MMHG | SYSTOLIC BLOOD PRESSURE: 154 MMHG

## 2018-02-13 DIAGNOSIS — J06.9: Primary | ICD-10-CM

## 2018-02-13 DIAGNOSIS — Z88.8: ICD-10-CM

## 2018-02-13 DIAGNOSIS — J45.909: ICD-10-CM

## 2018-02-13 DIAGNOSIS — Z88.2: ICD-10-CM

## 2018-02-13 PROCEDURE — 99212 OFFICE O/P EST SF 10 MIN: CPT

## 2018-02-13 PROCEDURE — 87502 INFLUENZA DNA AMP PROBE: CPT

## 2018-02-13 PROCEDURE — G0463 HOSPITAL OUTPT CLINIC VISIT: HCPCS

## 2019-01-07 ENCOUNTER — HOSPITAL ENCOUNTER (EMERGENCY)
Dept: HOSPITAL 25 - UCCORT | Age: 39
Discharge: HOME | End: 2019-01-07
Payer: COMMERCIAL

## 2019-01-07 VITALS — DIASTOLIC BLOOD PRESSURE: 70 MMHG | SYSTOLIC BLOOD PRESSURE: 140 MMHG

## 2019-01-07 DIAGNOSIS — Z88.8: ICD-10-CM
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
## 2019-01-07 DIAGNOSIS — S60.042A: Primary | ICD-10-CM

## 2019-01-07 DIAGNOSIS — Z88.1: ICD-10-CM

## 2019-01-07 DIAGNOSIS — Z88.2: ICD-10-CM

## 2019-01-07 DIAGNOSIS — S60.032A: ICD-10-CM
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
## 2019-01-07 PROCEDURE — 99212 OFFICE O/P EST SF 10 MIN: CPT

## 2019-01-07 PROCEDURE — 11740 EVACUATION SUBUNGUAL HMTMA: CPT

## 2019-01-07 PROCEDURE — G0463 HOSPITAL OUTPT CLINIC VISIT: HCPCS

## 2019-07-15 ENCOUNTER — HOSPITAL ENCOUNTER (EMERGENCY)
Dept: HOSPITAL 25 - UCCORT | Age: 39
Discharge: HOME | End: 2019-07-15
Payer: COMMERCIAL

## 2019-07-15 VITALS — DIASTOLIC BLOOD PRESSURE: 71 MMHG | SYSTOLIC BLOOD PRESSURE: 122 MMHG

## 2019-07-15 DIAGNOSIS — M25.571: Primary | ICD-10-CM

## 2019-07-15 PROCEDURE — G0463 HOSPITAL OUTPT CLINIC VISIT: HCPCS

## 2019-07-15 PROCEDURE — 99211 OFF/OP EST MAY X REQ PHY/QHP: CPT

## 2019-07-15 NOTE — UC
Lower Extremity/Ankle HPI





- HPI Summary


HPI Summary: 





Pt presents with c/o right ankle pain that has not improved over the last 7-10 

days.  Pt "twisted her ankle" ~ 7-10 days ago and was seen by provider in 

another area of [place].  Pt was diagnosed with ankle sprain and told to F/U with 

PCP or orthopedic provider.  Pt does not have PCP so, presents today with 

request for referral to Orthopedic provider. Pt brought radiology disc from 

visit from other provider.  I did not review it. 





- History of Current Complaint


Chief Complaint: UCLowerExtremity


Stated Complaint: ANKLE INJURY(7/2/19) RECHECK


Time Seen by Provider: 07/15/19 12:18


Hx Obtained From: Patient


Hx Last Menstrual Period: 7/1/19


Pregnant?: No


Onset/Duration: Sudden Onset, Lasting Days


Severity Initially: Moderate


Severity Currently: Moderate


Pain Intensity: 5


Pain Scale Used: 0-10 Numeric


Aggravating Factor(s): Standing, Ambulation


Alleviating Factor(s): Rest, Elevation


Able to Bear Weight: Yes





- Risk Factors


Gout Risk Factors: Negative


DVT Risk Factors: Negative


Septic Arthritis Risk Factor: Negative





- Allergies/Home Medications


Allergies/Adverse Reactions: 


 Allergies











Allergy/AdvReac Type Severity Reaction Status Date / Time


 


prednisone Allergy  Swelling Verified 07/15/19 12:01





   Of  





   Face,Lips,&  





   Throat  


 


ranitidine Allergy  Tachycardia Verified 07/15/19 12:01


 


Sulfa (Sulfonamide Allergy  Itching Verified 07/15/19 12:01





Antibiotics)     


 


Tetracyclines Allergy  Itching Verified 07/15/19 12:01


 


seasonal Allergy  Runny Nose Uncoded 07/15/19 12:01











Home Medications: 


 Home Medications





NK [No Home Medications Reported]  07/15/19 [History Confirmed 07/15/19]











PMH/Surg Hx/FS Hx/Imm Hx


Previously Healthy: Yes





- Surgical History


Surgical History: Yes


Surgery Procedure, Year, and Place: [place]olecystectomy, 03/01/17, [facility] [place]; Tubal 

Ligation, 2010, [facility] [place]; right hand x 2, including ulnar nerve entrapment 2000.  

tonsilectomy'87, GALLBLADDER REMOVED





- Family History


Known Family History: Positive: Cardiac Disease, Hypertension, Other - 

"Everything"


   Negative: Diabetes





- Social History


Occupation: Employed Full-time


Lives: With Family


Alcohol Use: None


Substance Use Type: None


Smoking Status (MU): Never Smoked Tobacco


Have You Smoked in the Last Year: No





- Immunization History


Most Recent Influenza Vaccination: 03/02/17


Most Recent Tetanus Shot: unknown


Most Recent Pneumonia Vaccination: approx 2010


Vaccination Up to Date: No





Review of Systems


All Other Systems Reviewed And Are Negative: Yes


Constitutional: Positive: Negative


Skin: Positive: Negative


Eyes: Positive: Negative


ENT: Positive: Negative


Respiratory: Positive: Negative


Cardiovascular: Positive: Negative


Gastrointestinal: Positive: Negative


Genitourinary: Positive: Negative


Motor: Positive: Negative, Other - pain, right ankle


Neurovascular: Positive: Negative


Musculoskeletal: Positive: Arthralgia - right ankle, Myalgia - right ankle


Neurological: Positive: Negative


Psychological: Positive: Negative


Is Patient Immunocompromised?: No





Physical Exam


Triage Information Reviewed: Yes


Appearance: Well-Appearing


Vital Signs: 


 Initial Vital Signs











Temp  98.2 F   07/15/19 12:03


 


Pulse  88   07/15/19 12:03


 


Resp  18   07/15/19 12:03


 


BP  122/71   07/15/19 12:03


 


Pulse Ox  98   07/15/19 12:03











Vital Signs Reviewed: Yes


Eye Exam: Normal


ENT Exam: Normal


Dental Exam: Normal


Neck exam: Normal


Respiratory: Positive: No respiratory distress


Musculoskeletal Exam: Normal


Musculoskeletal: Positive: Strength Intact


Neurological Exam: Normal


Psychological Exam: Normal


Skin Exam: Normal





Lower Extremity Course/Dx





- Differential Dx/Diagnosis


Differential Diagnosis/HQI/PQRI: Sprain, Strain


Provider Diagnosis: 


 Right ankle pain








Discharge





- Sign-Out/Discharge


Documenting (check all that apply): Patient Departure


All imaging exams completed and their final reports reviewed: No Studies





- Discharge Plan


Condition: Stable


Disposition: HOME


Patient Education Materials:  Arthralgia (ED), R.I.C.E. Treatment (ED), Safe 

Use of NSAIDs (ED)


Referrals: 


[facility] [place] PHYSICIAN REFERRAL [Outside] - As Soon As Possible


Anival Castro MD [Medical Doctor] - As Soon As Possible


No Primary Care Phys,NOPCP [Primary Care Provider] - 


Additional Instructions: 


Please establish care with a PCP and follow up with an orthopedic provider as 

needed. 





- Billing Disposition and Condition


Condition: STABLE


Disposition: Home

## 2019-08-03 ENCOUNTER — HOSPITAL ENCOUNTER (EMERGENCY)
Dept: HOSPITAL 25 - UCCORT | Age: 39
Discharge: HOME | End: 2019-08-03
Payer: COMMERCIAL

## 2019-08-03 VITALS — SYSTOLIC BLOOD PRESSURE: 150 MMHG | DIASTOLIC BLOOD PRESSURE: 84 MMHG

## 2019-08-03 DIAGNOSIS — J18.9: Primary | ICD-10-CM

## 2019-08-03 DIAGNOSIS — Z88.2: ICD-10-CM

## 2019-08-03 DIAGNOSIS — Z88.1: ICD-10-CM

## 2019-08-03 PROCEDURE — 99212 OFFICE O/P EST SF 10 MIN: CPT

## 2019-08-03 PROCEDURE — G0463 HOSPITAL OUTPT CLINIC VISIT: HCPCS

## 2019-08-03 PROCEDURE — 71046 X-RAY EXAM CHEST 2 VIEWS: CPT

## 2019-08-03 NOTE — UC
Respiratory Complaint HPI





- HPI Summary


HPI Summary: 





40 yo female presents with cough. She tells me that her son was diagnosed with 

PNA about 2 weeks ago. Over the last 5 days pt has developed a sinus headache, 

dry cough, wheezing, and subjective fever. Has been taking ibuprofen with 

little relief. She has a history of asthma and has a xopenex inhaler, but has 

not been using this. She does not smoke. Denies sore throat, SOB, chest pain, n/

v. 





- History of Current Complaint


Stated Complaint: FEVER,COUGH


Time Seen by Provider: 08/03/19 14:55


Hx Obtained From: Patient


Hx Last Menstrual Period: 7/1/19


Onset/Duration: Gradual Onset


Severity Initially: Moderate


Severity Currently: Moderate


Pain Intensity: 6


Pain Scale Used: 0-10 Numeric


Character: Cough: Nonproductive





- Allergies/Home Medications


Allergies/Adverse Reactions: 


 Allergies











Allergy/AdvReac Type Severity Reaction Status Date / Time


 


prednisone Allergy  Swelling Verified 08/03/19 15:04





   Of  





   Face,Lips,&  





   Throat  


 


ranitidine Allergy  Tachycardia Verified 08/03/19 15:04


 


Sulfa (Sulfonamide Allergy  Itching Verified 08/03/19 15:04





Antibiotics)     


 


Tetracyclines Allergy  Itching Verified 08/03/19 15:04


 


seasonal Allergy  Runny Nose Uncoded 08/03/19 15:04











Home Medications: 


 Home Medications





Ibuprofen 400 mg PO Q6HR PRN 08/03/19 [History Confirmed 08/03/19]











PMH/Surg Hx/FS Hx/Imm Hx


Respiratory History: Asthma





- Surgical History


Surgical History: Yes


Surgery Procedure, Year, and Place[place] [facility] [place] [facility] [place] 2, including ulnar nerve entrapment 2000.  

tonsilectomy'87, GALLBLADDER REMOVED





- Family History


Known Family History: Positive: Cardiac Disease, Hypertension, Other - 

"Everything"


   Negative: Diabetes





- Social History


Lives: With Family


Alcohol Use: None


Substance Use Type: None


Smoking Status (MU): Never Smoked Tobacco


Have You Smoked in the Last Year: No





- Immunization History


Most Recent Influenza Vaccination: 03/02/17


Most Recent Tetanus Shot: unknown


Most Recent Pneumonia Vaccination: approx 2010


Vaccination Up to Date: No





Review of Systems


All Other Systems Reviewed And Are Negative: Yes


Constitutional: Positive: Fever


Skin: Positive: Negative


Eyes: Positive: Negative


ENT: Positive: Sinus Pain/Tenderness


Respiratory: Positive: Cough


Cardiovascular: Positive: Negative


Gastrointestinal: Positive: Negative


Neurological: Positive: Negative


Psychological: Positive: Negative





Physical Exam





- Summary


Physical Exam Summary: 





GENERAL: NAD. WDWN. No pain distress.


SKIN: No rashes, sores, lesions, or open wounds.


HEENT:


            Head: AT/NC


            Eyes: Conjunctiva clear without inflammation or discharge.


            Ears: Hearing grossly normal. TMs intact, no bulging, erythema, or 

edema. 


            Nose: Nasal mucosa pink and moist. Mild TTP maxillary and frontal 

sinus. 


            Throat: Posterior oropharynx without exudates, erythema, or 

tonsillar enlargement.  Uvula midline.


NECK: Supple. Nontender. No lymphadenopathy. 


CHEST: Mild wheezing throughout. No r/r. No accessory muscle use. Breathing 

comfortably and in no distress.


CV:  RRR. Without m/r/g. Pulses intact. Cap refill <2seconds


NEURO: Alert. 


PSYCH: Age appropriate behavior.


Triage Information Reviewed: Yes


Vital Signs: 





Vital Signs:











Temp Pulse Resp BP Pulse Ox


 


 99.2 F   108   16   150/84   100 


 


 08/03/19 14:59  08/03/19 14:59  08/03/19 14:59  08/03/19 14:59  08/03/19 14:59











Vital Signs Reviewed: Yes





Respiratory Course/Dx





- Course


Course Of Treatment: 





CXR:


IMPRESSION: Airspace opacification in the right middle lobe is concerning for 

pneumonia..





She was given a levalbuterol and ipratropium nebulizer treatment in the clinic 

with good improvement. Less wheezing and reports easier to take a deep breath.





Rx for zpak and advised her to use her xopenex inhaler. 





- Differential Dx/Diagnosis


Provider Diagnosis: 


 Pneumonia








Discharge





- Sign-Out/Discharge


Documenting (check all that apply): Patient Departure


All imaging exams completed and their final reports reviewed: Yes





- Discharge Plan


Condition: Stable


Disposition: HOME


Prescriptions: 


Azithromycin TAB* [Zithromax TAB (Z-BARRY) 250 mg #6 tabs] 2 tab PO .TODAY, THEN 

1 DAILY #1 barry


Levalbuterol HFA INHALER* [Xopenex Hfa Inhaler*] 1 puff INH Q6H PRN #1 mdi


 PRN Reason: Shortness Of Breath


Patient Education Materials:  Pneumonia (ED)


Referrals: 


No Primary Care Phys,NOPCP [Primary Care Provider] - 


Additional Instructions: 


If you develop a fever, shortness of breath, chest pain, new or worsening 

symptoms - please call your PCP or go to the ED immediately.


 





Your blood pressure was high at todays visit. Please see your primary provider 

within 4 weeks for recheck and re-evaluation.








Use your inhaler as directed for wheezing, shortness of breath, or cough. 





- Billing Disposition and Condition


Condition: STABLE


Disposition: Home

## 2019-08-16 ENCOUNTER — HOSPITAL ENCOUNTER (EMERGENCY)
Dept: HOSPITAL 25 - UCCORT | Age: 39
Discharge: HOME | End: 2019-08-16
Payer: COMMERCIAL

## 2019-08-16 VITALS — DIASTOLIC BLOOD PRESSURE: 54 MMHG | SYSTOLIC BLOOD PRESSURE: 135 MMHG
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
## 2019-08-16 DIAGNOSIS — S61.531A: Primary | ICD-10-CM
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
## 2019-08-16 DIAGNOSIS — Z88.1: ICD-10-CM

## 2019-08-16 DIAGNOSIS — Z88.0: ICD-10-CM

## 2019-08-16 DIAGNOSIS — Z23: ICD-10-CM

## 2019-08-16 DIAGNOSIS — Z88.8: ICD-10-CM

## 2019-08-16 PROCEDURE — 90715 TDAP VACCINE 7 YRS/> IM: CPT

## 2019-08-16 PROCEDURE — G0463 HOSPITAL OUTPT CLINIC VISIT: HCPCS

## 2019-08-16 PROCEDURE — 99212 OFFICE O/P EST SF 10 MIN: CPT

## 2019-08-16 NOTE — XMS REPORT
Continuity of Care Document (CCD)

 Created on:2019



Patient:Cindy Callaway

Sex:Female

:1980

External Reference #:[id number]





Demographics







 Address  [address]



   [place] [address]

 

 Mobile Phone  [phone]

 

 Email Address  [email or web address]

 

 Preferred Language  en

 

 Marital Status  Not  or 

 

 Yazidi Affiliation  Unknown

 

 Race  White

 

 Ethnic Group  Not  or 









Author







 Name  Anival Castro MD (transmitted by agent of provider Patrick Miguel)

 

 Address  [address]



   Unavailable



   [place] [address]









Care Team Providers







 Name  Role  Phone

 

 Patient's Choice  Care Team Information   Unavailable









Problems







 Description

 

 No Information Available







Social History







 Type  Date  Description  Comments

 

 Birth Sex    Unknown  

 

 ETOH Use    Never used alcohol  

 

 Tobacco Use  Start: Unknown  Patient has never smoked  

 

 Recreational Drug Use    Never Used Drugs  

 

 Smoking Status  Reviewed: 19  Patient has never smoked  

 

 Exercise Type/Frequency    Does not exercise  







Allergies, Adverse Reactions, Alerts







 Active Allergies  Reaction  Severity  Comments  Date

 

 Tetracycline        2017

 

 Sulfa Antibiotics        2017

 

 Prednisone        2017

 

 Ranitidine      panic attacks  2019







Medications







 Active Medications  SIG  Qnty  Indications  Ordering Provider  Date

 

 Probiotic  1 by mouth every      Unknown  



         Capsules  day        



           

 

 Xopenex HFA  inhale two puffs      Unknown  



          45mcg/Act  by mouth four        



 Aerosol  times a day as        



   needed        

 

 Ibuprofen  2 tabs by mouth      Unknown  



        200mg Tablets  as needed        



           







Immunizations







 Description

 

 No Information Available







Vital Signs







 Date  Vital  Result  Comment

 

 2019  1:00pm  Height  65 inches  5'5"









 Weight  215.00 lb  

 

 Heart Rate  115 /min  

 

 BP Systolic Sitting  138 mmHg  

 

 BP Diastolic Sitting  80 mmHg  

 

 Pain Level  0  

 

 O2 % BldC Oximetry  98 %  

 

 BMI (Body Mass Index)  35.8 kg/m2  









 2019 11:11am  Height  65 inches  5'5"









 Weight  212.00 lb  

 

 Heart Rate  105 /min  

 

 BP Systolic Sitting  122 mmHg  

 

 BP Diastolic Sitting  88 mmHg  

 

 Respiratory Rate  16 /min  

 

 Pain Level  3  

 

 O2 % BldC Oximetry  98 %  

 

 BMI (Body Mass Index)  35.3 kg/m2  







Results







 Description

 

 No Information Available







Procedures







 Description

 

 No Information Available







Medical Devices







 Description

 

 No Information Available







Encounters







 Type  Date  Location  Provider  Dx  Diagnosis

 

 Office Visit  2019  [facility]  Anival Castro,  M76.71  
Peroneal



   11:00a  [facility] [place]  MD    tendinitis, right



           leg







Assessments







 Date  Code  Description  Provider

 

 2019  M76.71  Peroneal tendinitis, right leg  Anival Castro MD

 

 2019  M76.71  Peroneal tendinitis, right leg  Anival Castro MD







Plan of Treatment

2019 - Anival Castro, MDM76.71 Peroneal tendinitis, right legFollow up:
Follow up:   As needed



Functional Status







 Description

 

 No Information Available







Mental Status







 Description

 

 No Information Available







Referrals







 Description

 

 No Information Available

## 2019-08-16 NOTE — UC
Bite Injury/Animal HPI





- HPI Summary


HPI Summary: 





39-year-old female who was bitten by her own cat on her right wrist.  She has 4 

bite marks present.  Patient's tetanus is unknown.  The cat is up-to-date on 

immunizations.  She has had the cat a proximally 1 year and she states 

occasionally the cat will "act like a jerk "and bite her.





- History of Current Complaint


Chief Complaint: UCBiteInjury


Stated Complaint: CAT BITE


Time Seen by Provider: 08/16/19 13:29


Hx Obtained From: Patient


Hx Last Menstrual Period: 7/21/19


Pregnant?: No


Severity Currently: Mild


Severity Initially: Mild


Pain Intensity: 7


Onset/Duration: Sudden Onset


Type of Bite: Pet


Has Animal Been Immunized?: Yes - Cat is up-to-date on all immunizations 

according to the patient.


Character: Puncture - Four puncture wounds to the right wrist.


Associated Signs And Symptoms: Positive: Erythema


Animal Available for Observation: Yes


Animal Control Notified: Yes





- Allergies/Home Medications


Allergies/Adverse Reactions: 


 Allergies











Allergy/AdvReac Type Severity Reaction Status Date / Time


 


prednisone Allergy  Swelling Verified 08/16/19 13:20





   Of  





   Face,Lips,&  





   Throat  


 


ranitidine Allergy  Tachycardia Verified 08/16/19 13:20


 


Sulfa (Sulfonamide Allergy  Itching Verified 08/16/19 13:20





Antibiotics)     


 


Tetracyclines Allergy  Itching Verified 08/16/19 13:20


 


seasonal Allergy  Runny Nose Uncoded 08/16/19 13:20














PMH/Surg Hx/FS Hx/Imm Hx


Previously Healthy: Yes


Respiratory History: Asthma





- Surgical History


Surgical History: Yes


Surgery Procedure, Year, and Place: Cholecystectomy, 03/01/17, [facility] [place]; Tubal 

Ligation, 2010, [facility] [place]; right hand x 2, including ulnar nerve entrapment 2000.  

tonsilectomy'87, GALLBLADDER REMOVED





- Family History


Known Family History: Positive: Cardiac Disease, Hypertension, Other - 

"Everything"


   Negative: Diabetes





- Social History


Alcohol Use: None


Substance Use Type: None


Smoking Status (MU): Never Smoked Tobacco


Have You Smoked in the Last Year: No





- Immunization History


Most Recent Influenza Vaccination: 03/02/17


Most Recent Tetanus Shot: unknown


Most Recent Pneumonia Vaccination: approx 2010


Vaccination Up to Date: No





Review of Systems


All Other Systems Reviewed And Are Negative: Yes


Skin: Positive: Other - For puncture wounds and mild erythema around the 

puncture wounds from being bitten this morning.


Musculoskeletal: Positive: Other: - Mild pain around the puncture wounds.


Is Patient Immunocompromised?: No





Physical Exam


Triage Information Reviewed: Yes


Appearance: Well-Appearing, No Pain Distress, Well-Nourished


Vital Signs: 


 Initial Vital Signs











Temp  98.8 F   08/16/19 13:21


 


Pulse  105   08/16/19 13:21


 


Resp  16   08/16/19 13:21


 


BP  135/54   08/16/19 13:21


 


Pulse Ox  100   08/16/19 13:21











Vital Signs Reviewed: Yes


Musculoskeletal: Positive: Strength Intact, ROM Intact, Other: - Good 

peripheral pulses neuro sensation and capillary refill, good finger strength 

with flexion and extension against resistance.


Neurological: Positive: Alert, Muscle Tone Normal


Psychological Exam: Normal


Skin: Positive: Other - For puncture wounds near the right wrist surrounded by 

approximately 3 mm of erythema, no drainage.





Bite Injury Course/Dx





- Course


Course Of Treatment: 





The patient was given a Tdap tetanus immunization here.  She is leaving for 

vacation on Sunday so I advised her if she has any worsening symptoms she is to 

be seen at another urgent care or emergency room especially if she develops 

fever, chills or red streaks up her arm.  She is agreeable to this plan of 

action.





- Differential Dx/Diagnosis


Provider Diagnosis: 


 Cat bite of right wrist








Discharge





- Sign-Out/Discharge


Documenting (check all that apply): Patient Departure


All imaging exams completed and their final reports reviewed: No Studies





- Discharge Plan


Condition: Fair


Disposition: HOME


Prescriptions: 


Amoxicillin/Clavulanate TAB* [Augmentin *] 875 mg PO BID 10 Days #20 tab


Patient Education Materials:  Animal Bite (ED)


Referrals: 


No Primary Care Phys,NOPCP [Primary Care Provider] - 


Care [facility] [Outside]


Additional Instructions: 


Warm moist compresses to the bite area 4-6 times a day for 20 minutes each 

time.  Take the Augmentin with food.  If you develop worsening symptoms, red 

streaks up your arm, fever or chills your to go to the emergency room.  You 

were given a Tdap tetanus immunization which is good for 8-10 years.





- Billing Disposition and Condition


Condition: FAIR


Disposition: Home